# Patient Record
Sex: MALE | Race: WHITE | URBAN - METROPOLITAN AREA
[De-identification: names, ages, dates, MRNs, and addresses within clinical notes are randomized per-mention and may not be internally consistent; named-entity substitution may affect disease eponyms.]

---

## 2018-01-25 ENCOUNTER — TRANSFERRED RECORDS (OUTPATIENT)
Dept: HEALTH INFORMATION MANAGEMENT | Facility: CLINIC | Age: 63
End: 2018-01-25

## 2018-04-10 ENCOUNTER — TRANSFERRED RECORDS (OUTPATIENT)
Dept: HEALTH INFORMATION MANAGEMENT | Facility: CLINIC | Age: 63
End: 2018-04-10

## 2018-04-11 ENCOUNTER — REFERRAL (OUTPATIENT)
Dept: TRANSPLANT | Facility: CLINIC | Age: 63
End: 2018-04-11

## 2018-04-11 NOTE — LETTER
Request for Records from Referring Providers Office for Patients Referred to [facility] Solid Organ Transplant Program    April 30, 2018    Re: Michele Herrera   [address]  [place] [address]   1955    Requested Records       Due Responsible    24 hr impedence mammometry      6 Minute Walk      Angiogram      Chest CT      [facility] [place], [place]  2018      Chest XR      Colonoscopy      Stated none, will ask his PCP      Dental      Dexa scan      EKG      Echo w bubble study      Fecal occult blood      Gastric Emptying Study      Hip & Spine films XR      Left Heart Cath      Men: PSA      PFT      2018  [facility]      Quanitiative perfusion scan      Right Heart Cath      Sinus CT      Vaccinations up-to-date            In addition to the above records, please include all provider notes and diagnostic test results from the last 12 months.        Requested imaging may be electronically sent to the [employer] system via [id number] DICOM connection. When unable to send imaging electronically, an exported DICOM CD may be sent. Please indicate when imaging has been sent electronically on your return cover sheet.    Requested pathology slides should be accompanied by the appropriate report from your institution.    When the patient is hand carrying requested records or the requested records are not at your facility, please indicate this information on a return cover sheet.    Please fax requested paper records to [phone].     Please send all scans/slides to:   [facility]  Solid Organ Transplant Office  [address] [place] [id number]    Please call our office at [phone] if you have any questions or concerns.

## 2018-04-11 NOTE — TELEPHONE ENCOUNTER
Intake Progress Note      Nurse Call: 5/2/18 with Kingston yumiko 10- 12  Save the Date: pending npv and evaluation        Insurance information:  Medicare A/ B  Policy hutton: patient  Subscriber/policy/ID number:   Group Number:            Health Maintenance:  Colon: stated none, will ask PCP  PSA: stated none  Dental: visit in the past 1 year, no issues noted  Vaccines:   Special Needs (ie wheelchair, assistance, guardian, interpretor): n/a      Referral intake process completed.  Patient is aware that after financial approval is received, medical records will be requested.   Patient confirmed for a callback from transplant coordinator on 5/2.  Confirmed coordinator will discuss evaluation process in more detail at the time of their call.   Patient is aware of the need to arrange age appropriate cancer screening, vaccinations, and dental care.  Reminded patient to complete questionnaire, complete medical records release, and review packet prior to evaluation visit   Assessed patient for Special needs (ie wheelchair, assistance, guardian, and ): n/a   Patient instructed to call [phone] with questions.

## 2018-04-11 NOTE — LETTER
Michele Herrera  [address]  [place] [address]      Dear Michele,    Thank you for your interest in the Transplant Center at [facility], [facility]. We look forward to being a part of your care team and assisting you through the transplant process.    As we discussed, your transplant coordinator is Christina Clarke (Lung).  You may call your coordinator at any time with questions or concerns.  Your first scheduled call will be on  at 5/2/18.  If this needs to change, call [phone].    Please complete the following.    1. Fill out and return the enclosed forms    Authorization for Electronic Communication    Authorization to Discuss Protected Health Information    [employer] Release of Information    2. Sign up for:    [employer], access to your electronic medical record (see enclosed pamphlet)    [employer], a transplant education website (see enclosed booklet)    You can use these tools to learn more about your transplant, communicate with your care team, and track your medical details      Sincerely,      Solid Organ Transplant  [facility], [facility]    cc: Referring Physician PCP

## 2018-04-12 VITALS — WEIGHT: 121 LBS | BODY MASS INDEX: 16.39 KG/M2 | HEIGHT: 72 IN

## 2018-04-17 ENCOUNTER — DOCUMENTATION ONLY (OUTPATIENT)
Dept: TRANSPLANT | Facility: CLINIC | Age: 63
End: 2018-04-17

## 2018-04-17 NOTE — PROGRESS NOTES
Received message from PFELZA Miranda patient is not financially cleared for transplant until we receive current insurance information. Patient did not have current insurance information available at time of intake call.

## 2018-05-02 ENCOUNTER — TELEPHONE (OUTPATIENT)
Dept: TRANSPLANT | Facility: CLINIC | Age: 63
End: 2018-05-02

## 2018-05-02 DIAGNOSIS — J44.9 COPD (CHRONIC OBSTRUCTIVE PULMONARY DISEASE) (H): ICD-10-CM

## 2018-05-02 DIAGNOSIS — E46 MALNUTRITION (H): ICD-10-CM

## 2018-05-02 DIAGNOSIS — E88.01 ALPHA-1-ANTITRYPSIN DEFICIENCY (H): Primary | ICD-10-CM

## 2018-05-02 DIAGNOSIS — Z76.82 LUNG TRANSPLANT CANDIDATE: ICD-10-CM

## 2018-05-02 NOTE — TELEPHONE ENCOUNTER
"SOT LUNG INTAKE    May 2, 2018    Michele Herrrea  [id number]  Referring Provider: Edison Dailey   Source/Facility: [facility]    Diagnosis: A1AT/COPD  Level 22 mg/dl; Phenotype (ZZ)  Dx: 5 years ago, no infusion treatments initiated  62 year old    Height: 6  Weight: 121 lbs  BMI: 16.5 (134 lbs, BMI 18) Was on Daliresp, stopped by Dr Dailey to to continue nausea and subsequent weightloss    Patient noted his normal weight 160, reporting he has lost 40 lbs over last 1-2 years. Weight reported on PFTs completed 1/25/2018     Social History:    Social History     Social History     Marital status:      Spouse name: N/A     Number of children: N/A     Years of education: N/A     Occupational History     Not on file.     Social History Main Topics     Smoking status: Former Smoker     Packs/day: 0.50     Types: Cigarettes     Start date: 1/1/1973     Quit date: 1/1/1984     Smokeless tobacco: Never Used     Alcohol use Yes      Comment: cocktail occ      Drug use: No     Sexual activity: Not on file     Other Topics Concern     Not on file     Social History Narrative       Smoking History: 1 ppd   Quit Date: Quit 1984     Tobacco Use: None   Quit date: n/a    Street Drug Use: Marijuana   Quit Date: Cant remember last use reporting it has not been recent    ETOH Use: Beer, 6 pack/day, patient reports \"I really love my beer\", but did not believe he would have any issues with etoh abstinence.    Quit Date: n/a    Second-hand Smoke exposure: None    Family History:    Family History   Problem Relation Age of Onset     CANCER Mother      CANCER Sister        Past Medical History  Pulmonary Manifestations date: 2006 approximately  Diabetes: No  Coronary Artery Disease: No, Family Hx: Mother  Hypertension: No   Previous transfusion(s): No  History of Cancer: No, Family Hx: sister has brain CA   GERD: Yes, omeprazole daily for several years, patient reports his GERD symptoms are well controled.    Bleeding " Disorders: No    Other Past Medical History: Patient reports having pericarditis in his 20s a couple of times.     Past Medical History:   Diagnosis Date     Alpha-1-antitrypsin deficiency (H)      COPD (chronic obstructive pulmonary disease) (H)        Surgical History   Lung Biopsy: No  Pneumothorax: No  Chest Surgery: No    No past surgical history on file.    Mental Health History  Depression: denies  Anxiety: denies  Other: n/a    Medications  No current outpatient prescriptions on file.     No current facility-administered medications for this visit.      Blood thinner hx: No  Prednisone hx: No daily rx, reported two large tapers within last 12 months  Antibiotic hx: Azithromycin 250 mg/ daily  Narcotic hx: denies    Allergies  Review of patient's allergies indicates no known allergies.      Pulmonary Tests and Status  PFT's  Date: 01/28/2018 [facility]   FVC 4.25 85%  FEV1 1.27 34%  DLCO  13 mL 36% TLC 9.18L 125%  Date: 04/11/2016 [facility] ([facility]   FVC 4.12 83%  FEV1 1.28 34%  DLCO 10.05L 34% TLC 9.30 129%    6 Minute Walk: 04/10/2018 1000 Ft on 1L    Oxygen use - Patient reports he is not sure how much O2 he should be using    Per Dr Dailey's note dated 4/10/2018 Patient should be on following:   At rest: 0L   Sleep: 1L   With Activity: 1-2L ( During walk test patient desaturated to 87% at 400 ft on RA. 1L applied and patient's saturations increased to 91%).    Date of O2 initiation: Noted as patient uses but not indicated Liter flow    Oxygen Company: [employer], Initiated through Dr Dailey, [facility]     Sleep Study: No    BiPAP/CPAP: No - Though per Dr Dailey's note 4/10/2018 he discussed the possibnilty of using the Trilogy (NIV) to help with work of breathing at night and limit CO2 retension.     Pulmonary Rehab: Has not completed    Current activity:  Basic ADLs    Feeding: No issues noted  Toileting: No issues noted  Selecting proper attire: Not  assessed  Grooming: No issues noted  Maintaining continence: No issues noted  Putting on clothing: No issues noted  Bathing; No issues noted  Walking & Transferring: Patient reports walking about 1 block before feeling SOB    Instrumental ADLs    Managing Finances; No issues noted  Handling Transportation ; No issues noted  Shopping ; No issues noted  Preparing meals ; No issues noted  Using telephone & communication devices: No in home computer or wifi  Managing medications: No issues noted, patient reported he takes very few medications.   Housework & basic home maintenance: Limited but tries to do as much as he can to help out      Hospitalizations in prior 12 months - No hospitalizations in the last 12 months. Last reported hospitalization approximately 3 years ago due to pneumonia.     Diagnostic Tests/Imaging  Heart cath: No  Stress Test: No    ECHO: 07/18/2018  Impressions:  The echocardiographic study was abnormal. Bicuspid aortic valve with mild  stenosis. Moderate dilation of the aortic root and ascending aorta without  aortic insufficiency. Small apical pericardial effusion without hemodynamic  consequences. When compared to previous study of 5/24/17, the aortic valve is  slightly more stenotic and the pericardial effusion appears to be new. Other  findings are stable. There was no echocardiographic evidence for a cardiac  source of embolism. Consider ZACHARY if cardiac source of embolus appears highly  Likely.  ECHO 05/24/2017  Impressions:  The echocardiographic study was abnormal. LVEF 60% with probable bicuspid aortic  valve with moderate dilation of the aortic root (45mm). Mild aortic stenosis  Visually.    Primary Care  Health Maintenance   Topic Date Due     TETANUS IMMUNIZATION (SYSTEM ASSIGNED)  09/17/1973     HIV SCREEN (SYSTEM ASSIGNED)  09/17/1973     HEPATITIS C SCREENING  09/17/1973     LIPID SCREEN Q5 YR MALE (SYSTEM ASSIGNED)  09/17/1990     COLON CANCER SCREEN (SYSTEM ASSIGNED)  09/17/2005      ADVANCE DIRECTIVE PLANNING Q5 YRS  09/17/2010     INFLUENZA VACCINE (Season Ended) 09/01/2018       Colonoscopy: None  Dental: Last seen 2 years ago, not current issues noted    Labs  A1AT:4/11/2018 [facility] 28; Phenotype  ZZ  Rheumatology: No  Cystic Fibrosis: No  Cultures: No        Psycho-Social Assessment  Spouse/Significant Other/Partner: Cami   Location: [place]   Distance from [facility]: [address] Miles   Support System: Agustín   Location: [place]   Distance from [facility]: 40    Employment Status: Diabled  (Full-time, part-time, disabled, etc.)  Work history: Business Owner  Toxic Substance Exposure: Denies exposure to asbestos, pesticides, dust  Home Environment: Home with stairs  Pets/Birds: [place]    Home Health care utilized: No    Financial Concerns: None

## 2018-07-30 NOTE — PROGRESS NOTES
"Reason for Visit  Michele Herrera is a 62 year old year old male who is being seen for Transplant Evaluation (Pre lung tx eval for COPD)      Pulmonary HPI  The patient was seen and examined by Jorge Alberto Brooke MD.     Mr. Herrera is a 62-year-old gentleman with history of COPD due to alpha-1 antitrypsin deficiency who is being seen today in our clinic to be considered for lung transplantation candidacy. He is being seen at the request of Dr. Edison Dailey [Pulmonologist]. Reviewed outside records.       He first noticed becoming more short of breath with daily, normal manual labor about 9 years ago, which prompted his wife to make him go to the doctor who diagnosed him with COPD. About 6 years ago, he visited a specialist who determined the patient had alpha-1 antitrypsin deficiency. The last three dawson, the patient has had pneumonia twice and bronchitis twice. Two years ago, he was hospitalized for the first time and then hospitalized again for five days. His primary care doctor then had him visit Dr. Dailey [Pulmonlogist].    He is supposed to be on oxygen. However, he does not use all the time. He may use oxygen when confined in his house during the winter and may occasionally use it at night, prior to sleeping. He does not like using oxygen at night because he \"flops around like a crappy out of water\" while sleeping. He will use 1.5 LPM O2 NC. Otherwise, he will use BiPAP. He patient received a BiPAP machine about a month ago and will use an hour in the morning and an hour at night. He will also use the BiPAP if he feels he is not breathing well. He finds it beneficial.    Manual labor is what primarily causes him to become short of breath. 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 The patient has grown accustomed to his declining lung function and has learned to take his time more. He has no problem walking on flat ground or climbing the 15 steps in his home. However, prior to using the BiPAP, he " "did have trouble climbing the 15 steps in his home.    The last time the patient needed Prednisone was in January 2018. He also ill in November 2017 and December 2017. In addition, the patient was hospitalized in the winter of 2016/2017.        He also has \"six things wrong with his back\" and is in a lot of pain. Sitting and walking cause him pain, but he is much better than in the past. Stretching alleviates his low back pain. He states he \"broke his back\" while rollerblading with his son, but never went to the doctor.    He will only occasionally cough after nebulizing. He nebulizes in the morning and at night. He does have a baseline wheezing, but can be worse if he overexerts himself doing manual labor. He has heartburn, but it is managed with Omeprazole. Before his heartburn being managed, the patient would vomit every morning. This has since resolved since taking Omeprazole. No sinus issues, headaches, or chest pain. No constipation, diarrhea, or bloody or dark/tarry stools. No trouble passing urine or CHARLIE. No arthralgia or unusual numbness or tingling in arms or legs. No fevers, chills, or sweats. No ear infections.    The patient weighs 130 pounds and has lost 30 pounds in the last 12 years. He has no appetite. The patient mainly eats one big meal at night, but is trying to eat fruit, drink a Boost, and eat a granola bar throughout the day as well.    No surgeries or need for blood transfusions in the past.      The patient also notes an incidence of spontaneous, transient vision loss in right eye. He visited an ophthalmologist who noted he was having right eye strokes. After being instructed to take a daily aspirin, the patient has not had spontaneous, transient vision loss in his right eye. He was recommended to have an echocardiogram and EKG.      Family History: Maternal uncle smokes cigarettes and has liver disease and alpha-1 antitrypsin deficiency (COPD).    Oxygen Use: He is currently using 1-2 L as " needed. At rest does not seem to need oxygen. But while walking his O2 sats 90 seem to drop to 87% at 400 feet. He is able to maintain his sats greater than 90% on 1 L/min oxygen.    Exposure History: The patient quit smoking cigarettes in 1983, but started when he was 18 years old - for about 15 years. He would smoke 0.5 to 0.75 pack of cigarettes per day. The patient also used to smoke marijuana when he was younger. He has eaten edible marijuana more recently with last eating about two weeks ago. The patient has drank about 5 to 6 beers since 18 years old.    No known allergies to dust, pollen, or other environmental allergens.    The patient currently has a dog at home. 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    Occupational History: He worked as a  with no occupational related asbestos or dust exposures. The patient had his own farm, raises own meat and vegetables, but states he doesn't have an appetite so doesn't eat much.    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 Also, in the past, he did lawn-care for 15 years. No exposure to TB. 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      Current Outpatient Prescriptions   Medication     acetaminophen-codeine (TYLENOL #3) 300-30 MG per tablet     albuterol (PROAIR HFA/PROVENTIL HFA/VENTOLIN HFA) 108 (90 Base) MCG/ACT Inhaler     arformoterol (BROVANA) 15 MCG/2ML NEBU neb solution     azithromycin (ZITHROMAX) 250 MG tablet     budesonide (PULMICORT) 0.5 MG/2ML neb solution     ibuprofen (ADVIL/MOTRIN) 800 MG tablet     ipratropium (ATROVENT HFA) 17 MCG/ACT Inhaler     ipratropium - albuterol 0.5 mg/2.5 mg/3 mL (DUONEB) 0.5-2.5 (3) MG/3ML neb solution     omeprazole (PRILOSEC) 20 MG CR capsule     traZODone (DESYREL) 50 MG tablet     No current facility-administered medications for this visit.      No Known Allergies  Past Medical History:   Diagnosis Date     Alpha-1-antitrypsin  deficiency (H)      COPD (chronic obstructive pulmonary disease) (H)      GERD (gastroesophageal reflux disease)        No past surgical history on file.    Social History     Social History     Marital status:      Spouse name: N/A     Number of children: N/A     Years of education: N/A     Occupational History     Not on file.     Social History Main Topics     Smoking status: Former Smoker     Packs/day: 1.00     Types: Cigarettes     Start date: 1/1/1963     Quit date: 1/25/1984     Smokeless tobacco: Never Used     Alcohol use Yes      Comment: cocktail occ      Drug use: No     Sexual activity: Not on file     Other Topics Concern     Not on file     Social History Narrative       Family History   Problem Relation Age of Onset     Cancer Mother      Cancer Sister        ROS Pulmonary  Constitutional- Positive. He has had a 40 pound weight loss recently but now it is stable.  Eyes- Negative  Ear, nose and throat- Negative  Cardiac- Negative  Pulm- See HPI  GI- Positive. Heartburn with associated emesis that is managed with Omeprazole.  Genitourinary- Negative  Musculoskeletal- Positive. Chronic low back pain that is aggravated with sitting and walking and alleviated by stretching.  Neurology- Negative  Dermatology- Negative  Endocrine- Negative  Lymphatic- Negative  Psychiatry- Negative  A complete ROS was otherwise negative except as noted in the HPI.    /77 (BP Location: Right arm, Patient Position: Sitting, Cuff Size: Adult Regular)  Pulse 84  Temp 98.2  F (36.8  C) (Oral)  Resp 18  Ht 1.829 m (6')  Wt 59 kg (130 lb)  SpO2 95%  BMI 17.63 kg/m2  Exam:   GENERAL APPEARANCE: Well developed, thin gentleman, alert, and in no apparent distress.  EYES: PERRL, EOMI  HENT: Nasal mucosa with no edema and no hyperemia. No nasal polyps.  EARS: Canals clear, TMs normal  MOUTH: Oral mucosa is moist, without any lesions, no tonsillar enlargement, no oropharyngeal exudate.  NECK: supple, no masses, no  thyromegaly.  LYMPHATICS: No significant axillary, cervical, or supraclavicular nodes.  RESP: normal percussion, diminished air flow throughout.  No crackles. No rhonchi. No wheezes.  CV: Normal S1, S2, regular rhythm, normal rate. No murmur.  No rub. No gallop. No LE edema.   ABDOMEN:  Bowel sounds normal, soft, nontender, no HSM or masses.   MS: extremities normal. No clubbing. No cyanosis.  SKIN: no rash on limited exam. He is very Tanned.  NEURO: Mentation intact, speech normal, normal strength and tone, normal gait and stance  PSYCH: mentation appears normal. and affect normal/bright.    Results:  Recent Results (from the past 168 hour(s))   6 minute walk test    Collection Time: 07/31/18 12:00 AM   Result Value Ref Range    6 min walk (FT) 1250 ft    6 Min Walk (M) 381 m   CBC with platelets    Collection Time: 07/31/18 10:25 AM   Result Value Ref Range    WBC 7.9 4.0 - 11.0 10e9/L    RBC Count 4.62 4.4 - 5.9 10e12/L    Hemoglobin 15.7 13.3 - 17.7 g/dL    Hematocrit 46.8 40.0 - 53.0 %     (H) 78 - 100 fl    MCH 34.0 (H) 26.5 - 33.0 pg    MCHC 33.5 31.5 - 36.5 g/dL    RDW 13.2 10.0 - 15.0 %    Platelet Count 179 150 - 450 10e9/L   Comprehensive metabolic panel    Collection Time: 07/31/18 10:25 AM   Result Value Ref Range    Sodium 140 133 - 144 mmol/L    Potassium 4.5 3.4 - 5.3 mmol/L    Chloride 108 94 - 109 mmol/L    Carbon Dioxide 25 20 - 32 mmol/L    Anion Gap 7 3 - 14 mmol/L    Glucose 104 (H) 70 - 99 mg/dL    Urea Nitrogen 15 7 - 30 mg/dL    Creatinine 1.02 0.66 - 1.25 mg/dL    GFR Estimate 74 >60 mL/min/1.7m2    GFR Estimate If Black 89 >60 mL/min/1.7m2    Calcium 8.9 8.5 - 10.1 mg/dL    Bilirubin Total 0.9 0.2 - 1.3 mg/dL    Albumin 3.7 3.4 - 5.0 g/dL    Protein Total 6.8 6.8 - 8.8 g/dL    Alkaline Phosphatase 90 40 - 150 U/L    ALT 40 0 - 70 U/L    AST 42 0 - 45 U/L   Blood gas venous    Collection Time: 07/31/18 10:25 AM   Result Value Ref Range    Ph Venous 7.40 7.32 - 7.43 pH    PCO2 Venous  48 40 - 50 mm Hg    PO2 Venous 19 (L) 25 - 47 mm Hg    Bicarbonate Venous 29 (H) 21 - 28 mmol/L    Base Excess Venous 3.3 mmol/L    FIO2 21.0    General PFT Lab (Please always keep checked)    Collection Time: 07/31/18 10:35 AM   Result Value Ref Range    FVC-Pred 4.85 L    FVC-Pre 3.28 L    FVC-%Pred-Pre 67 %    FEV1-Pre 0.93 L    FEV1-%Pred-Pre 25 %    FEV1FVC-Pred 75 %    FEV1FVC-Pre 28 %    FEFMax-Pred 9.46 L/sec    FEFMax-Pre 3.69 L/sec    FEFMax-%Pred-Pre 38 %    FEF2575-Pred 2.96 L/sec    FEF2575-Pre 0.31 L/sec    URO6262-%Pred-Pre 10 %    ExpTime-Pre 13.69 sec    FIFMax-Pre 5.75 L/sec    VC-Pred 5.28 L    VC-Pre 3.63 L    VC-%Pred-Pre 68 %    IC-Pred 3.27 L    IC-Pre 2.40 L    IC-%Pred-Pre 73 %    ERV-Pred 2.01 L    ERV-Pre 1.22 L    ERV-%Pred-Pre 60 %    FEV1FEV6-Pred 79 %    FEV1FEV6-Pre 38 %    FRCPleth-Pred 3.76 L    FRCPleth-Pre 6.38 L    FRCPleth-%Pred-Pre 169 %    RVPleth-Pred 2.55 L    RVPleth-Pre 5.15 L    RVPleth-%Pred-Pre 202 %    TLCPleth-Pred 7.53 L    TLCPleth-Pre 8.78 L    TLCPleth-%Pred-Pre 116 %    DLCOunc-Pred 28.61 ml/min/mmHg    DLCOunc-Pre 15.00 ml/min/mmHg    DLCOunc-%Pred-Pre 52 %    DLCOcor-Pre 14.57 ml/min/mmHg    DLCOcor-%Pred-Pre 50 %    VA-Pre 5.79 L    VA-%Pred-Pre 80 %    FEV1SVC-Pred 70 %    FEV1SVC-Pre 26 %       PFTs were reviewed by me.  FEV 1 0.93L 25%  FVC 3.28L 67%  TLC 8.78L 116%  RV 5.15L 202%  DLCO 15.00 52%    Interpretation:  Very severe obstructive ventilatory defect with moderate decrease in diffusion capacity.  Valid Maneuver    6MWT: Walked 1250ft (381m), O2 sats dropped down to 88%.    Echo (7/18/18): lVEF 60%. Mild diastolic dysfunction. Bicuspid aortic valve with mild stenosis. Moderate dilation of the aortic root and ascending aorta without aortic insufficiency. Small apical pericardial effusion without hemodynamic consequences. When compared to previous study of 5/24/17, the aortic valve is slightly more stenotic and the pericardial effusion appears to be  "new. There was no echocardiographic evidence for a cardiac source of embolism. Consider ZACHARY if cardiac source of embolus appears highly likely.  The root exhibited moderate dilation. There was moderate dilatation of the ascending aorta. The aortic root diameter was 47 mm. The ascending aortic AP dimension was 48 mm.    Assessment and plan: Mr. Herrera is a 62-year-old gentleman with history of COPD due to alpha-1 antitrypsin deficiency who is being seen today in our clinic clinic to be considered for lung transplantation candidacy. He is being seen at the request of Dr. Edison Dailey [pulmonologist].    1) Severe COPD: Due to A1AT deficiency and past h/o smoking cigarettes. He is currently on maximal therapy and well managed by Dr. Dailey. ANNELISE index shows 57% 4yr survival.    He is using Brovana and Pulmicort nebs consistently.    Tried Daliresp but had to stop due to nausea. Now on chronic Azithromycin.    Hypoxic Respiratory Failure: Using O2 appropriately. On Non-invasive ventilation.    7/31/2018: FEV1 today (0.93) is worse from previous evaluation (1.27) at [facility]. Pulmonary symptoms are currently stable. He was recommended to use his oxygen more to prevent stress on heart. Will continue current medications at this time.    2) GERD: As of 7/31/2018, GERD with associated morning emesis has been managed very well with Omeprazole.    3) Low BMI: Current Body mass index is 17.63 kg/(m^2).   7/31/2018: He was encouraged to include more fat in his diet as well as to eat more to gain weight. He will monitor his weight at home with home scale. Goal BMI >18. Ideally 19 - 20.    4) Elevated MCV: No anemia.  - Would recommend follow up with PCP. Might related to alcohol intake.  - consider checking Vit b12/folate level.    5) H/o Pericarditis: \"All his life\".   - Not on any meds for that.    6) Chronic back pain: Currently managed with prn ibuprofen.    7) Spontaneous, Transient Right Eye Vision Loss: He has had " spontaneous, transient right eye vision loss. He visited the ophthalmologist who recommended a daily Aspirin. He has not had spontaneous, transient right eye vision loss since.  - Currently undergoing cardiac w/u.    8) Healthcare Maintenance:  - Recommended to get a colonoscopy in the near future.  - Recommended liver be monitored.  - Recommended lowering and discontinuing, alcohol consumption.    9) Lung Transplant Consideration:  A. I spent quite some time discussing both the lung transplantation evaluation listing process including complications that can be expected post lung transplantation.     B. One of the main points I did reinforce is that the survival post lung transplantation at this time is around 50 to 55% at 5 years. The main reason for this is infection and/or rejection. While most bacterial infections are treatable, the viral infections can cause significant morbidity and mortality. Acute rejection is usually treatable with high dose steroids but chronic rejection (AHSAN) as you already know does not have good therapy as of date. The other main point is that there is minimal to no survival advantage with lung transplantation.    C. The lung transplant evaluation will involve multiple tests and meeting with cardiothoracic surgeon, Transplant , Nutritionist etc., from our transplant team. Post testing, we will discuss the details at our transplant meeting and will be listed if he meets the criteria for lung transplantation.     D. Once on the list, Michele Herrera can expect to stay on the list anywhere from few months to few years, depending on the LAS score. Also the other factors that might play a role is number of organs required and whether he is positive for panel reactive antibodies. He has not recieved transfusions to date. He will need to stay within a 50 mile radius of our center for the first three months after the lung transplantation (after hospital discharge).    E. Post lung  transplantation, he will require multiple bronchoscopies to evaluate for infections and/or rejections. The major complications post transplantation experienced by most of our patients include:    1. Diabetes, about 30 to 40% of our patients remain on insulin for the rest of their life.  2. Chronic kidney disease, with majority losing about 50% of the kidney function and upto 5 ot 10% requiring hemodialysis and/or renal transplantation.  3. Hypertension, usually well controlled with medications.  4. Malignancy: Especially of skin cancer, and/or lymphoma - usually treatable.    The above is not an exhaustive list of all the complications. The others include also airway complications occurring in about 5% of the patients requiring bronchoscopy with bronchial dilation, sometimes stent placement. There is increased risk for DVT and PE particularly in the first six months after transplantation.     F. Discussed with Michele Herrera that lung transplantation is an option. The other option is to continue as is and continue cares with us or with his local pulmonologist. We will glad to have palliative care team involved in your care to help manage your symptoms.    G. Discussed about increased risk donors (For HIV/Hep C predominantly).      I discussed indications for lung transplantation for the COPD population. This includes a decline in the FEV1 to less than 25% of predicted, worsening respiratory status (more dyspnea), more frequent or prolonged exacerbations, and oxygen requirements of 2L of more. Also discussed the lung transplant benefit in COPD population, an article which was in the 2008 American Journal of Respiratory and Critical Care Medicine. That analysis of 10,000 wait-list patients showed that COPD patients with an FEV1 of less than 25% of predicted and who are oxygen dependent do indeed survive longer with transplant than without. While that data is not meant to be used clinically, it does give us some  evidence that indeed transplant for some COPD patients does result in improved survival.     We then discussed that although we believe that transplant for the COPD population results in improved quality of life and function, there is no empirical evidence to support that. In fact, there are no studies that have measured quality of life or functional status pre and posttransplant consistently in large populations. It is of course our goal to make that happen. Why that may not happen is the potential complications posttransplant complications.      RTC in 6 to 12 months with spirometry.  I spent >60mins in face to face meeting in counselling and co-ordination.    Scribe Disclosure:   I, Hussein Montoya, am serving as a scribe; to document services personally performed by Jorge Alberto Brooke MD based on data collection and the provider's statements to me.     Provider Disclosure:  I agree with above History, Review of Systems, Physical exam and Plan. I have reviewed the content of the documentation and have edited it as needed. I have personally performed the services documented here and the documentation accurately represents those services and the decisions I have made.      Electronically signed by:  Jorge Alberto Brooke MD.

## 2018-07-31 ENCOUNTER — APPOINTMENT (OUTPATIENT)
Dept: TRANSPLANT | Facility: CLINIC | Age: 63
End: 2018-07-31
Attending: INTERNAL MEDICINE
Payer: MEDICARE

## 2018-07-31 VITALS
SYSTOLIC BLOOD PRESSURE: 135 MMHG | TEMPERATURE: 98.2 F | HEART RATE: 84 BPM | BODY MASS INDEX: 17.61 KG/M2 | DIASTOLIC BLOOD PRESSURE: 77 MMHG | OXYGEN SATURATION: 95 % | HEIGHT: 72 IN | WEIGHT: 130 LBS | RESPIRATION RATE: 18 BRPM

## 2018-07-31 DIAGNOSIS — E88.01 ALPHA-1-ANTITRYPSIN DEFICIENCY (H): ICD-10-CM

## 2018-07-31 DIAGNOSIS — Z76.82 LUNG TRANSPLANT CANDIDATE: Primary | ICD-10-CM

## 2018-07-31 DIAGNOSIS — Z76.82 LUNG TRANSPLANT CANDIDATE: ICD-10-CM

## 2018-07-31 DIAGNOSIS — J44.9 COPD (CHRONIC OBSTRUCTIVE PULMONARY DISEASE) (H): ICD-10-CM

## 2018-07-31 DIAGNOSIS — J43.2 CENTRILOBULAR EMPHYSEMA (H): ICD-10-CM

## 2018-07-31 LAB
6 MIN WALK (FT): 1250 FT
6 MIN WALK (M): 381 M
ALBUMIN SERPL-MCNC: 3.7 G/DL (ref 3.4–5)
ALP SERPL-CCNC: 90 U/L (ref 40–150)
ALT SERPL W P-5'-P-CCNC: 40 U/L (ref 0–70)
ANION GAP SERPL CALCULATED.3IONS-SCNC: 7 MMOL/L (ref 3–14)
AST SERPL W P-5'-P-CCNC: 42 U/L (ref 0–45)
BASE EXCESS BLDV CALC-SCNC: 3.3 MMOL/L
BILIRUB SERPL-MCNC: 0.9 MG/DL (ref 0.2–1.3)
BUN SERPL-MCNC: 15 MG/DL (ref 7–30)
CALCIUM SERPL-MCNC: 8.9 MG/DL (ref 8.5–10.1)
CHLORIDE SERPL-SCNC: 108 MMOL/L (ref 94–109)
CO2 SERPL-SCNC: 25 MMOL/L (ref 20–32)
CREAT SERPL-MCNC: 1.02 MG/DL (ref 0.66–1.25)
DLCOCOR-%PRED-PRE: 50 %
DLCOCOR-PRE: 14.57 ML/MIN/MMHG
DLCOUNC-%PRED-PRE: 52 %
DLCOUNC-PRE: 15 ML/MIN/MMHG
DLCOUNC-PRED: 28.61 ML/MIN/MMHG
ERV-%PRED-PRE: 60 %
ERV-PRE: 1.22 L
ERV-PRED: 2.01 L
ERYTHROCYTE [DISTWIDTH] IN BLOOD BY AUTOMATED COUNT: 13.2 % (ref 10–15)
EXPTIME-PRE: 13.69 SEC
FEF2575-%PRED-PRE: 10 %
FEF2575-PRE: 0.31 L/SEC
FEF2575-PRED: 2.96 L/SEC
FEFMAX-%PRED-PRE: 38 %
FEFMAX-PRE: 3.69 L/SEC
FEFMAX-PRED: 9.46 L/SEC
FEV1-%PRED-PRE: 25 %
FEV1-PRE: 0.93 L
FEV1FEV6-PRE: 38 %
FEV1FEV6-PRED: 79 %
FEV1FVC-PRE: 28 %
FEV1FVC-PRED: 75 %
FEV1SVC-PRE: 26 %
FEV1SVC-PRED: 70 %
FIFMAX-PRE: 5.75 L/SEC
FRCPLETH-%PRED-PRE: 169 %
FRCPLETH-PRE: 6.38 L
FRCPLETH-PRED: 3.76 L
FVC-%PRED-PRE: 67 %
FVC-PRE: 3.28 L
FVC-PRED: 4.85 L
GFR SERPL CREATININE-BSD FRML MDRD: 74 ML/MIN/1.7M2
GLUCOSE SERPL-MCNC: 104 MG/DL (ref 70–99)
HCO3 BLDV-SCNC: 29 MMOL/L (ref 21–28)
HCT VFR BLD AUTO: 46.8 % (ref 40–53)
HGB BLD-MCNC: 15.7 G/DL (ref 13.3–17.7)
IC-%PRED-PRE: 73 %
IC-PRE: 2.4 L
IC-PRED: 3.27 L
MCH RBC QN AUTO: 34 PG (ref 26.5–33)
MCHC RBC AUTO-ENTMCNC: 33.5 G/DL (ref 31.5–36.5)
MCV RBC AUTO: 101 FL (ref 78–100)
O2/TOTAL GAS SETTING VFR VENT: 21 %
PCO2 BLDV: 48 MM HG (ref 40–50)
PH BLDV: 7.4 PH (ref 7.32–7.43)
PLATELET # BLD AUTO: 179 10E9/L (ref 150–450)
PO2 BLDV: 19 MM HG (ref 25–47)
POTASSIUM SERPL-SCNC: 4.5 MMOL/L (ref 3.4–5.3)
PROT SERPL-MCNC: 6.8 G/DL (ref 6.8–8.8)
RBC # BLD AUTO: 4.62 10E12/L (ref 4.4–5.9)
RVPLETH-%PRED-PRE: 202 %
RVPLETH-PRE: 5.15 L
RVPLETH-PRED: 2.55 L
SODIUM SERPL-SCNC: 140 MMOL/L (ref 133–144)
TLCPLETH-%PRED-PRE: 116 %
TLCPLETH-PRE: 8.78 L
TLCPLETH-PRED: 7.53 L
VA-%PRED-PRE: 80 %
VA-PRE: 5.79 L
VC-%PRED-PRE: 68 %
VC-PRE: 3.63 L
VC-PRED: 5.28 L
WBC # BLD AUTO: 7.9 10E9/L (ref 4–11)

## 2018-07-31 PROCEDURE — 82803 BLOOD GASES ANY COMBINATION: CPT | Performed by: INTERNAL MEDICINE

## 2018-07-31 PROCEDURE — 85027 COMPLETE CBC AUTOMATED: CPT | Performed by: INTERNAL MEDICINE

## 2018-07-31 PROCEDURE — 80053 COMPREHEN METABOLIC PANEL: CPT | Performed by: INTERNAL MEDICINE

## 2018-07-31 PROCEDURE — 36415 COLL VENOUS BLD VENIPUNCTURE: CPT | Performed by: INTERNAL MEDICINE

## 2018-07-31 PROCEDURE — G0463 HOSPITAL OUTPT CLINIC VISIT: HCPCS | Mod: ZF

## 2018-07-31 RX ORDER — ARFORMOTEROL TARTRATE 15 UG/2ML
15 SOLUTION RESPIRATORY (INHALATION) 2 TIMES DAILY
COMMUNITY
Start: 2018-01-25 | End: 2019-01-25

## 2018-07-31 RX ORDER — IPRATROPIUM BROMIDE AND ALBUTEROL SULFATE 2.5; .5 MG/3ML; MG/3ML
SOLUTION RESPIRATORY (INHALATION) DAILY
COMMUNITY
Start: 2017-12-22

## 2018-07-31 RX ORDER — IBUPROFEN 800 MG/1
800 TABLET, FILM COATED ORAL EVERY 8 HOURS PRN
COMMUNITY

## 2018-07-31 RX ORDER — ALBUTEROL SULFATE 90 UG/1
1-2 AEROSOL, METERED RESPIRATORY (INHALATION) EVERY 4 HOURS PRN
COMMUNITY
Start: 2018-01-30

## 2018-07-31 RX ORDER — BUDESONIDE 0.5 MG/2ML
INHALANT ORAL 2 TIMES DAILY
COMMUNITY
Start: 2018-01-16

## 2018-07-31 RX ORDER — TRAZODONE HYDROCHLORIDE 50 MG/1
50 TABLET, FILM COATED ORAL AT BEDTIME
COMMUNITY
Start: 2018-04-09

## 2018-07-31 RX ORDER — AZITHROMYCIN 250 MG/1
250 TABLET, FILM COATED ORAL DAILY
COMMUNITY
Start: 2018-04-12

## 2018-07-31 ASSESSMENT — PAIN SCALES - GENERAL: PAINLEVEL: NO PAIN (0)

## 2018-07-31 NOTE — PROGRESS NOTES
Outpatient MNT: Lung Transplant Evaluation    Current BMI: 17.6 (HT 72 in,  lbs/59 kg)  BMI is below criteria of >18 for lung transplant  Goal weight for lung transplant >131 lbs      Time Spent: 15 minutes  Visit Type: Initial  Referring Physician: Dr Brooke   Pt accompanied by: his wife, Cami     History of previous txp: none     Nutrition Assessment  Pt and wife both cook at home. Pt reports overall improved appetite recently. C/o nausea and diarrhea x 2 months when on a certain medication, which now he is off of. Appetite not quite back to baseline, as he reports 'I've never been normal'. Long habit of only grazing during the day, eating 1 solid meal in the evening. He does report he is intentionally trying to gain weight.     Vitamins, Supplements, Pertinent Meds: none   Herbal Medicines/Supplements: none     Diet Recall  Breakfast None    Lunch Grazes throughout the day on fruit, granola bars   Dinner Meat/protein + veggies + small portion of starch    Snacks None    Beverages 1 Boost/day for the past few months, coffee    Alcohol 6 pack of beer/day, so average of 42 beers/week (summer); pt reports he is more active in the summer and thus drinks more in the summer; pt drinks less in the winter, but still drinks 4 drinks/day at minimum; he also reports having quit drinking for a few years a while back and has no concern about quitting this time around    Dining out 2x/month      Physical Activity  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 He did lose down to his lowest of 122 lbs (from starting point in 130s roughly) when on reported medication, but has regained some and is stable currently.     Adj/dosing BW: 130 lbs/59 kg       Frailty Screening   Weakness Meets criteria for frailty if   strength (average of 3 trials, dominant hand) is:    Men  ?29 kg for BMI ?24  ?30 kg for BMI 24.1-26  ?30 kg for BMI 26.1-28  ?32 kg for BMI >28 Women  ?17 kg for BMI ?23  ?17.3 kg for BMI 23.1-26  ?18 kg for BMI 26.1-29  ?21 kg for BMI >29    Equipment: Shaq hand dynamometer  Participant attempts to squeeze the dynamometer maximally 3 times with the dominant hand.   Average of 3 trials: 41 kg with BMI of 17.6  Meets criteria for frailty based on handgrip strength: no     Labs  No results for input(s): CHOL, HDL, LDL, TRIG, CHOLHDLRATIO in the last 86133 hours.  No results found for: A1C    Malnutrition  % Intake: No decreased intake noted  % Weight Loss: None noted  Subcutaneous Fat Loss: No loss noted, but low at baseline  Muscle Loss: No loss noted, but low at baseline  Fluid Accumulation/Edema: None noted  Malnutrition Diagnosis: Patient does not meet two of the above criteria necessary for diagnosing malnutrition, however, would suspect pt is at risk for malnutrition given low BMI, difficulty gaining weight back to 140-150s (prior UBW), heavy EtOH use and likely nutritional deficiencies that are correlated with EtOH intake.     Estimated Nutrition Needs  Energy  [phone]     (30-35-40 kcal/kg for increased needs)     Protein  59-71    (1-1.2 g/kg for repletion)         Fluid  1 ml/kcal or per MD     Nutrition Diagnosis  Inadequate protein intake (?energy intake as well) r/t long habit of eating 1 meal/day with some grazing throughout the day, other medical issues in the past causing pt reduced appetite and intake AEB weight loss down to 122 lbs, now back at 130 lbs but BMI still underweight <18 and below lung transplant BMI criteria, low muscle and fat mass at baseline, pt report of trying to gain weight yet weight stable lately, diet recall showing 1 meal/day with significant amount of calories coming from alcohol.     Food and nutrition related knowledge deficit r/t pre lung transplant eval AEB pt  verbalized not hearing pre/post transplant diet guidelines.    Nutrition Intervention  Nutrition education provided:  Did briefly mention and discuss with pt need for abstinence from alcohol post transplant. Pt reports he has quit drinking before and forsees no difficulty or issue stopping drinking.     Reviewed BMI criteria for lung transplant. Encouraged pt to gain at least 5 lbs, preferably more, to serve as a buffer and discussed that it is common to lose weight in the acute post txp phase. Encouraged him to continue with 1 Boost/day (or more) and to eat something more substantial during the day, such as another protein shake or protein bars. Pt reports he is willing to do this. It will be interesting to see how pt's weight may or may not change when he cuts out alcohol.     Reviewed post txp diet guidelines in brief (will review in further detail post txp):  (1) Review of proper food safety measures d/t immunosuppressant therapy post-op and increased risk for food-borne illness    (2) Avoid the following post txp d/t risk for rejection, unknown effects on the organs, and/or potential interactions with immunosuppressants:  - Herbal, Chinese, holistic, chiropractic, natural, alternative medicines and supplements  - Detoxes and cleanses  - Weight loss pills  - Protein powders or other products with extracts or herbs (ie green tea extract)    (3) Med regimen and possible side effects    Patient Understanding: Pt verbalized understanding of education provided.  Expected Compliance: Fair/Good  Follow-Up Plans: PRN     Nutrition Goals  1. Pt to verbalize understanding of 3 aspects of post txp education provided  2. BMI >18 or >131 lbs for transplant eligibility     Provided pt with contact info.   Constance Chavez RD, LD  [facility] [phone]

## 2018-07-31 NOTE — LETTER
"7/31/2018       RE: Michele Herrera  [address] [place] [address]     Dear Colleague,    Thank you for referring your patient, Michele Herrera, to the [facility] at [facility]. Please see a copy of my visit note below.    Reason for Visit  Michele Herrera is a 62 year old year old male who is being seen for Transplant Evaluation (Pre lung tx eval for COPD)      Pulmonary HPI  The patient was seen and examined by Jorge Alberto Brooke MD.     Mr. Herrera is a 62-year-old gentleman with history of COPD due to alpha-1 antitrypsin deficiency who is being seen today in our clinic to be considered for lung transplantation candidacy. He is being seen at the request of Dr. Edison Dailey [Pulmonologist]. Reviewed outside records.       He first noticed becoming more short of breath with daily, normal manual labor about 9 years ago, which prompted his wife to make him go to the doctor who diagnosed him with COPD. About 6 years ago, he visited a specialist who determined the patient had alpha-1 antitrypsin deficiency. The last three dawson, the patient has had pneumonia twice and bronchitis twice. Two years ago, he was hospitalized for the first time and then hospitalized again for five days. His primary care doctor then had him visit Dr. Dailey [Pulmonlogist].    He is supposed to be on oxygen. However, he does not use all the time. He may use oxygen when confined in his house during the winter and may occasionally use it at night, prior to sleeping. He does not like using oxygen at night because he \"flops around like a crappy out of water\" while sleeping. He will use 1.5 LPM O2 NC. Otherwise, he will use BiPAP. He patient received a BiPAP machine about a month ago and will use an hour in the morning and an hour at night. He will also use the BiPAP if he feels he is not breathing well. He finds it beneficial.    Manual labor is what primarily causes him to become " "short of breath. 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 The patient has grown accustomed to his declining lung function and has learned to take his time more. He has no problem walking on flat ground or climbing the 15 steps in his home. However, prior to using the BiPAP, he did have trouble climbing the 15 steps in his home.    The last time the patient needed Prednisone was in January 2018. He also ill in November 2017 and December 2017. In addition, the patient was hospitalized in the winter of 2016/2017.        He also has \"six things wrong with his back\" and is in a lot of pain. Sitting and walking cause him pain, but he is much better than in the past. Stretching alleviates his low back pain. He states he \"broke his back\" while rollerblading with his son, but never went to the doctor.    He will only occasionally cough after nebulizing. He nebulizes in the morning and at night. He does have a baseline wheezing, but can be worse if he overexerts himself doing manual labor. He has heartburn, but it is managed with Omeprazole. Before his heartburn being managed, the patient would vomit every morning. This has since resolved since taking Omeprazole. No sinus issues, headaches, or chest pain. No constipation, diarrhea, or bloody or dark/tarry stools. No trouble passing urine or CHARLIE. No arthralgia or unusual numbness or tingling in arms or legs. No fevers, chills, or sweats. No ear infections.    The patient weighs 130 pounds and has lost 30 pounds in the last 12 years. He has no appetite. The patient mainly eats one big meal at night, but is trying to eat fruit, drink a Boost, and eat a granola bar throughout the day as well.    No surgeries or need for blood transfusions in the past.      The patient also notes an incidence of spontaneous, transient vision loss in right eye. He visited an ophthalmologist who noted he was having right eye strokes. After being instructed to take a daily aspirin, the " patient has not had spontaneous, transient vision loss in his right eye. He was recommended to have an echocardiogram and EKG.      Family History: Maternal uncle smokes cigarettes and has liver disease and alpha-1 antitrypsin deficiency (COPD).    Oxygen Use: He is currently using 1-2 L as needed. At rest does not seem to need oxygen. But while walking his O2 sats 90 seem to drop to 87% at 400 feet. He is able to maintain his sats greater than 90% on 1 L/min oxygen.    Exposure History: The patient quit smoking cigarettes in 1983, but started when he was 18 years old - for about 15 years. He would smoke 0.5 to 0.75 pack of cigarettes per day. The patient also used to smoke marijuana when he was younger. He has eaten edible marijuana more recently with last eating about two weeks ago. The patient has drank about 5 to 6 beers since 18 years old.    No known allergies to dust, pollen, or other environmental allergens.    The patient currently has a dog at home. 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    Occupational History: He worked as a  with no occupational related asbestos or dust exposures. The patient had his own farm, raises own meat and vegetables, but states he doesn't have an appetite so doesn't eat much.    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 Also, in the past, he did lawn-care for 15 years. No exposure to TB. 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      Current Outpatient Prescriptions   Medication     acetaminophen-codeine (TYLENOL #3) 300-30 MG per tablet     albuterol (PROAIR HFA/PROVENTIL HFA/VENTOLIN HFA) 108 (90 Base) MCG/ACT Inhaler     arformoterol (BROVANA) 15 MCG/2ML NEBU neb solution     azithromycin (ZITHROMAX) 250 MG tablet     budesonide (PULMICORT) 0.5 MG/2ML neb solution     ibuprofen (ADVIL/MOTRIN) 800 MG tablet     ipratropium (ATROVENT HFA) 17 MCG/ACT Inhaler     ipratropium - albuterol 0.5  mg/2.5 mg/3 mL (DUONEB) 0.5-2.5 (3) MG/3ML neb solution     omeprazole (PRILOSEC) 20 MG CR capsule     traZODone (DESYREL) 50 MG tablet     No current facility-administered medications for this visit.      No Known Allergies  Past Medical History:   Diagnosis Date     Alpha-1-antitrypsin deficiency (H)      COPD (chronic obstructive pulmonary disease) (H)      GERD (gastroesophageal reflux disease)        No past surgical history on file.    Social History     Social History     Marital status:      Spouse name: N/A     Number of children: N/A     Years of education: N/A     Occupational History     Not on file.     Social History Main Topics     Smoking status: Former Smoker     Packs/day: 1.00     Types: Cigarettes     Start date: 1/1/1963     Quit date: 1/25/1984     Smokeless tobacco: Never Used     Alcohol use Yes      Comment: cocktail occ      Drug use: No     Sexual activity: Not on file     Other Topics Concern     Not on file     Social History Narrative       Family History   Problem Relation Age of Onset     Cancer Mother      Cancer Sister        ROS Pulmonary  Constitutional- Positive. He has had a 40 pound weight loss recently but now it is stable.  Eyes- Negative  Ear, nose and throat- Negative  Cardiac- Negative  Pulm- See HPI  GI- Positive. Heartburn with associated emesis that is managed with Omeprazole.  Genitourinary- Negative  Musculoskeletal- Positive. Chronic low back pain that is aggravated with sitting and walking and alleviated by stretching.  Neurology- Negative  Dermatology- Negative  Endocrine- Negative  Lymphatic- Negative  Psychiatry- Negative  A complete ROS was otherwise negative except as noted in the HPI.    /77 (BP Location: Right arm, Patient Position: Sitting, Cuff Size: Adult Regular)  Pulse 84  Temp 98.2  F (36.8  C) (Oral)  Resp 18  Ht 1.829 m (6')  Wt 59 kg (130 lb)  SpO2 95%  BMI 17.63 kg/m2  Exam:   GENERAL APPEARANCE: Well developed, thin gentleman,  alert, and in no apparent distress.  EYES: PERRL, EOMI  HENT: Nasal mucosa with no edema and no hyperemia. No nasal polyps.  EARS: Canals clear, TMs normal  MOUTH: Oral mucosa is moist, without any lesions, no tonsillar enlargement, no oropharyngeal exudate.  NECK: supple, no masses, no thyromegaly.  LYMPHATICS: No significant axillary, cervical, or supraclavicular nodes.  RESP: normal percussion, diminished air flow throughout.  No crackles. No rhonchi. No wheezes.  CV: Normal S1, S2, regular rhythm, normal rate. No murmur.  No rub. No gallop. No LE edema.   ABDOMEN:  Bowel sounds normal, soft, nontender, no HSM or masses.   MS: extremities normal. No clubbing. No cyanosis.  SKIN: no rash on limited exam. He is very Tanned.  NEURO: Mentation intact, speech normal, normal strength and tone, normal gait and stance  PSYCH: mentation appears normal. and affect normal/bright.    Results:  Recent Results (from the past 168 hour(s))   6 minute walk test    Collection Time: 07/31/18 12:00 AM   Result Value Ref Range    6 min walk (FT) 1250 ft    6 Min Walk (M) 381 m   CBC with platelets    Collection Time: 07/31/18 10:25 AM   Result Value Ref Range    WBC 7.9 4.0 - 11.0 10e9/L    RBC Count 4.62 4.4 - 5.9 10e12/L    Hemoglobin 15.7 13.3 - 17.7 g/dL    Hematocrit 46.8 40.0 - 53.0 %     (H) 78 - 100 fl    MCH 34.0 (H) 26.5 - 33.0 pg    MCHC 33.5 31.5 - 36.5 g/dL    RDW 13.2 10.0 - 15.0 %    Platelet Count 179 150 - 450 10e9/L   Comprehensive metabolic panel    Collection Time: 07/31/18 10:25 AM   Result Value Ref Range    Sodium 140 133 - 144 mmol/L    Potassium 4.5 3.4 - 5.3 mmol/L    Chloride 108 94 - 109 mmol/L    Carbon Dioxide 25 20 - 32 mmol/L    Anion Gap 7 3 - 14 mmol/L    Glucose 104 (H) 70 - 99 mg/dL    Urea Nitrogen 15 7 - 30 mg/dL    Creatinine 1.02 0.66 - 1.25 mg/dL    GFR Estimate 74 >60 mL/min/1.7m2    GFR Estimate If Black 89 >60 mL/min/1.7m2    Calcium 8.9 8.5 - 10.1 mg/dL    Bilirubin Total 0.9 0.2 -  1.3 mg/dL    Albumin 3.7 3.4 - 5.0 g/dL    Protein Total 6.8 6.8 - 8.8 g/dL    Alkaline Phosphatase 90 40 - 150 U/L    ALT 40 0 - 70 U/L    AST 42 0 - 45 U/L   Blood gas venous    Collection Time: 07/31/18 10:25 AM   Result Value Ref Range    Ph Venous 7.40 7.32 - 7.43 pH    PCO2 Venous 48 40 - 50 mm Hg    PO2 Venous 19 (L) 25 - 47 mm Hg    Bicarbonate Venous 29 (H) 21 - 28 mmol/L    Base Excess Venous 3.3 mmol/L    FIO2 21.0    General PFT Lab (Please always keep checked)    Collection Time: 07/31/18 10:35 AM   Result Value Ref Range    FVC-Pred 4.85 L    FVC-Pre 3.28 L    FVC-%Pred-Pre 67 %    FEV1-Pre 0.93 L    FEV1-%Pred-Pre 25 %    FEV1FVC-Pred 75 %    FEV1FVC-Pre 28 %    FEFMax-Pred 9.46 L/sec    FEFMax-Pre 3.69 L/sec    FEFMax-%Pred-Pre 38 %    FEF2575-Pred 2.96 L/sec    FEF2575-Pre 0.31 L/sec    FZH5440-%Pred-Pre 10 %    ExpTime-Pre 13.69 sec    FIFMax-Pre 5.75 L/sec    VC-Pred 5.28 L    VC-Pre 3.63 L    VC-%Pred-Pre 68 %    IC-Pred 3.27 L    IC-Pre 2.40 L    IC-%Pred-Pre 73 %    ERV-Pred 2.01 L    ERV-Pre 1.22 L    ERV-%Pred-Pre 60 %    FEV1FEV6-Pred 79 %    FEV1FEV6-Pre 38 %    FRCPleth-Pred 3.76 L    FRCPleth-Pre 6.38 L    FRCPleth-%Pred-Pre 169 %    RVPleth-Pred 2.55 L    RVPleth-Pre 5.15 L    RVPleth-%Pred-Pre 202 %    TLCPleth-Pred 7.53 L    TLCPleth-Pre 8.78 L    TLCPleth-%Pred-Pre 116 %    DLCOunc-Pred 28.61 ml/min/mmHg    DLCOunc-Pre 15.00 ml/min/mmHg    DLCOunc-%Pred-Pre 52 %    DLCOcor-Pre 14.57 ml/min/mmHg    DLCOcor-%Pred-Pre 50 %    VA-Pre 5.79 L    VA-%Pred-Pre 80 %    FEV1SVC-Pred 70 %    FEV1SVC-Pre 26 %       PFTs were reviewed by me.  FEV 1 0.93L 25%  FVC 3.28L 67%  TLC 8.78L 116%  RV 5.15L 202%  DLCO 15.00 52%    Interpretation:  Very severe obstructive ventilatory defect with moderate decrease in diffusion capacity.  Valid Maneuver    6MWT: Walked 1250ft (381m), O2 sats dropped down to 88%.    Echo (7/18/18): lVEF 60%. Mild diastolic dysfunction. Bicuspid aortic valve with mild stenosis.  Moderate dilation of the aortic root and ascending aorta without aortic insufficiency. Small apical pericardial effusion without hemodynamic consequences. When compared to previous study of 5/24/17, the aortic valve is slightly more stenotic and the pericardial effusion appears to be new. There was no echocardiographic evidence for a cardiac source of embolism. Consider ZACHARY if cardiac source of embolus appears highly likely.  The root exhibited moderate dilation. There was moderate dilatation of the ascending aorta. The aortic root diameter was 47 mm. The ascending aortic AP dimension was 48 mm.    Assessment and plan: Mr. Herrera is a 62-year-old gentleman with history of COPD due to alpha-1 antitrypsin deficiency who is being seen today in our clinic clinic to be considered for lung transplantation candidacy. He is being seen at the request of Dr. Edison Dailey [pulmonologist].    1) Severe COPD: Due to A1AT deficiency and past h/o smoking cigarettes. He is currently on maximal therapy and well managed by Dr. Dailey. ANNELISE index shows 57% 4yr survival.    He is using Brovana and Pulmicort nebs consistently.    Tried Daliresp but had to stop due to nausea. Now on chronic Azithromycin.    Hypoxic Respiratory Failure: Using O2 appropriately. On Non-invasive ventilation.    7/31/2018: FEV1 today (0.93) is worse from previous evaluation (1.27) at [facility]. Pulmonary symptoms are currently stable. He was recommended to use his oxygen more to prevent stress on heart. Will continue current medications at this time.    2) GERD: As of 7/31/2018, GERD with associated morning emesis has been managed very well with Omeprazole.    3) Low BMI: Current Body mass index is 17.63 kg/(m^2).   7/31/2018: He was encouraged to include more fat in his diet as well as to eat more to gain weight. He will monitor his weight at home with home scale. Goal BMI >18. Ideally 19 - 20.    4) Elevated MCV: No anemia.  - Would recommend follow  "up with PCP. Might related to alcohol intake.  - consider checking Vit b12/folate level.    5) H/o Pericarditis: \"All his life\".   - Not on any meds for that.    6) Chronic back pain: Currently managed with prn ibuprofen.    7) Spontaneous, Transient Right Eye Vision Loss: He has had spontaneous, transient right eye vision loss. He visited the ophthalmologist who recommended a daily Aspirin. He has not had spontaneous, transient right eye vision loss since.  - Currently undergoing cardiac w/u.    8) Healthcare Maintenance:  - Recommended to get a colonoscopy in the near future.  - Recommended liver be monitored.  - Recommended lowering and discontinuing, alcohol consumption.    9) Lung Transplant Consideration:  A. I spent quite some time discussing both the lung transplantation evaluation listing process including complications that can be expected post lung transplantation.     B. One of the main points I did reinforce is that the survival post lung transplantation at this time is around 50 to 55% at 5 years. The main reason for this is infection and/or rejection. While most bacterial infections are treatable, the viral infections can cause significant morbidity and mortality. Acute rejection is usually treatable with high dose steroids but chronic rejection (AHSAN) as you already know does not have good therapy as of date. The other main point is that there is minimal to no survival advantage with lung transplantation.    C. The lung transplant evaluation will involve multiple tests and meeting with cardiothoracic surgeon, Transplant , Nutritionist etc., from our transplant team. Post testing, we will discuss the details at our transplant meeting and will be listed if he meets the criteria for lung transplantation.     D. Once on the list, Michele Herrera can expect to stay on the list anywhere from few months to few years, depending on the LAS score. Also the other factors that might play a role is " number of organs required and whether he is positive for panel reactive antibodies. He has not recieved transfusions to date. He will need to stay within a 50 mile radius of our center for the first three months after the lung transplantation (after hospital discharge).    E. Post lung transplantation, he will require multiple bronchoscopies to evaluate for infections and/or rejections. The major complications post transplantation experienced by most of our patients include:    1. Diabetes, about 30 to 40% of our patients remain on insulin for the rest of their life.  2. Chronic kidney disease, with majority losing about 50% of the kidney function and upto 5 ot 10% requiring hemodialysis and/or renal transplantation.  3. Hypertension, usually well controlled with medications.  4. Malignancy: Especially of skin cancer, and/or lymphoma - usually treatable.    The above is not an exhaustive list of all the complications. The others include also airway complications occurring in about 5% of the patients requiring bronchoscopy with bronchial dilation, sometimes stent placement. There is increased risk for DVT and PE particularly in the first six months after transplantation.     F. Discussed with Michele Herrera that lung transplantation is an option. The other option is to continue as is and continue cares with us or with his local pulmonologist. We will glad to have palliative care team involved in your care to help manage your symptoms.    G. Discussed about increased risk donors (For HIV/Hep C predominantly).      I discussed indications for lung transplantation for the COPD population. This includes a decline in the FEV1 to less than 25% of predicted, worsening respiratory status (more dyspnea), more frequent or prolonged exacerbations, and oxygen requirements of 2L of more. Also discussed the lung transplant benefit in COPD population, an article which was in the 2008 American Journal of Respiratory and Critical  Care Medicine. That analysis of 10,000 wait-list patients showed that COPD patients with an FEV1 of less than 25% of predicted and who are oxygen dependent do indeed survive longer with transplant than without. While that data is not meant to be used clinically, it does give us some evidence that indeed transplant for some COPD patients does result in improved survival.     We then discussed that although we believe that transplant for the COPD population results in improved quality of life and function, there is no empirical evidence to support that. In fact, there are no studies that have measured quality of life or functional status pre and posttransplant consistently in large populations. It is of course our goal to make that happen. Why that may not happen is the potential complications posttransplant complications.      RTC in 6 to 12 months with spirometry.  I spent >60mins in face to face meeting in counselling and co-ordination.    Scribe Disclosure:   I, Hussein Montoya, am serving as a scribe; to document services personally performed by Jorge Alberto Brooke MD based on data collection and the provider's statements to me.     Provider Disclosure:  I agree with above History, Review of Systems, Physical exam and Plan. I have reviewed the content of the documentation and have edited it as needed. I have personally performed the services documented here and the documentation accurately represents those services and the decisions I have made.      Electronically signed by:  Jorge Alberto Brooke MD.    Again, thank you for allowing me to participate in the care of your patient.      Sincerely,    Jorge Alberto Brooke MD

## 2018-07-31 NOTE — MR AVS SNAPSHOT
"              After Visit Summary   7/31/2018    Michele Herrera    MRN: [id number]           Patient Information     Date Of Birth          1955        Visit Information        Provider Department      7/31/2018 1:10 PM Jorge Alberto Brooke MD [facility] Solid Organ Transplant        Care Instructions    Current oxygen use: Rest 0L Activity 1.5L Sleep 1.5L     6' 0\" 130 lbs 0 oz Body mass index is 17.63 kg/(m^2).  Goal BMI greater than 18, less than 30 for lung transplant.   Continue to eat a healthy diet which includes proteins and healthy fats.      Medication changes:  No changes made this visit  Future orders: No additional orders placed this visit  Antibody blood test (PRA) due every 3 months:  This is a specific transplant lab to be completed during and after lung transplant evalution.  Next appointment: No return visit scheduled today   Test or procedures to be complete prior to next appointment:   PFTs: Completed today, will redo annually or as MD recommends    6MW: Completed today, will redo annually or as MD recommends   CT Scan: Not completed today    Preventive Care:    Colorectal Cancer Screening: During our visit today, we discussed that it is recommended you receive colorectal cancer screening. Please call or make an appointment with your primary care provider to discuss this. You may also call the [facility] scheduling line ([phone]) to set up a colonoscopy appointment.    Pulmonary Rehab: Please remember to stay active.  Continue exercises learned in pulmonary rehab or continue participating in pulmonary rehab, if able.      Please remember to stay up to date with your primary care requirements including: Annual check-ups with primary care physician     PSA (for men over 50)   Dental visits   Annual flu shots/ immunizations as needed   Colonoscopy (patients over 50).     Thank-you for allowing us to participate in your care.    If your condition should change, please contact your transplant " coordinator. This includes: worsening symptoms, need for antibiotics, hospitalizations, transfusions.    Thoracic Transplant Office phone [phone], option 2, fax [phone]    Office Hours 8:30 - 5:00 pm              Follow-ups after your visit        Who to contact     If you have questions or need follow up information about today's clinic visit or your schedule please contact [facility] SOLID ORGAN TRANSPLANT directly at [phone].  Normal or non-critical lab and imaging results will be communicated to you by MyChart, letter or phone within 4 business days after the clinic has received the results. If you do not hear from us within 7 days, please contact the clinic through Devunityhart or phone. If you have a critical or abnormal lab result, we will notify you by phone as soon as possible.  Submit refill requests through Ghost or call your pharmacy and they will forward the refill request to us. Please allow 3 business days for your refill to be completed.          Additional Information About Your Visit        Care EveryWhere ID     This is your Care EveryWhere ID. This could be used by other organizations to access your [place] medical records  [id number]        Your Vitals Were     Pulse Temperature Respirations Height Pulse Oximetry BMI (Body Mass Index)    84 98.2  F (36.8  C) (Oral) 18 1.829 m (6') 95% 17.63 kg/m2       Blood Pressure from Last 3 Encounters:   07/31/18 135/77    Weight from Last 3 Encounters:   07/31/18 59 kg (130 lb)   04/12/18 54.9 kg (121 lb)              Today, you had the following     No orders found for display      Information about OPIOIDS     PRESCRIPTION OPIOIDS: WHAT YOU NEED TO KNOW   We gave you an opioid (narcotic) pain medicine. It is important to manage your pain, but opioids are not always the best choice. You should first try all the other options your care team gave you. Take this medicine for as short a time (and as few doses) as possible.     These medicines  have risks:    DO NOT drive when on new or higher doses of pain medicine. These medicines can affect your alertness and reaction times, and you could be arrested for driving under the influence (DUI). If you need to use opioids long-term, talk to your care team about driving.    DO NOT operate heave machinery    DO NOT do any other dangerous activities while taking these medicines.     DO NOT drink any alcohol while taking these medicines.      If the opioid prescribed includes acetaminophen, DO NOT take with any other medicines that contain acetaminophen. Read all labels carefully. Look for the word  acetaminophen  or  Tylenol.  Ask your pharmacist if you have questions or are unsure.    You can get addicted to pain medicines, especially if you have a history of addiction (chemical, alcohol or substance dependence). Talk to your care team about ways to reduce this risk.    Store your pills in a secure place, locked if possible. We will not replace any lost or stolen medicine. If you don t finish your medicine, please throw away (dispose) as directed by your pharmacist. The [place] [employer]ution [employer] has more information about safe disposal: https://www.pca.[facility].[place]/living-green/managing-unwanted-medications.     All opioids tend to cause constipation. Drink plenty of water and eat foods that have a lot of fiber, such as fruits, vegetables, prune juice, apple juice and high-fiber cereal. Take a laxative (Miralax, milk of magnesia, Colace, Senna) if you don t move your bowels at least every other day.          Primary Care Provider Office Phone # Fax #    Dakota Romero [phone]       [place] [place] [place] [address]        Equal Access to Services     [facility]: Hadii sheron meehan hadashchinyere Sobrice, waaxda luqadaha, qaybta kaalmada orlando, claudia canales . So [facility] [phone].    ATENCIÓN: Si habla español, tiene a vizcarra disposición servicios gratuitos  de asistencia lingüística. Sophie montes [phone].    We comply with applicable federal civil rights laws and [place] laws. We do not discriminate on the basis of race, color, national origin, age, disability, sex, sexual orientation, or gender identity.            Thank you!     Thank you for choosing [facility] SOLID ORGAN TRANSPLANT  for your care. Our goal is always to provide you with excellent care. Hearing back from our patients is one way we can continue to improve our services. Please take a few minutes to complete the written survey that you may receive in the mail after your visit with us. Thank you!             Your Updated Medication List - Protect others around you: Learn how to safely use, store and throw away your medicines at www.disposemymeds.org.          This list is accurate as of 7/31/18  2:37 PM.  Always use your most recent med list.                   Brand Name Dispense Instructions for use Diagnosis    acetaminophen-codeine 300-30 MG per tablet    TYLENOL #3     Take 1 tablet by mouth as needed        albuterol 108 (90 Base) MCG/ACT Inhaler    PROAIR HFA/PROVENTIL HFA/VENTOLIN HFA     Inhale 1-2 puffs into the lungs every 4 hours as needed for shortness of breath / dyspnea        arformoterol 15 MCG/2ML Nebu neb solution    BROVANA     Take 15 mcg by nebulization 2 times daily        azithromycin 250 MG tablet    ZITHROMAX     Take 250 mg by mouth daily        budesonide 0.5 MG/2ML neb solution    PULMICORT     Take by nebulization 2 times daily        ibuprofen 800 MG tablet    ADVIL/MOTRIN     Take 800 mg by mouth every 8 hours as needed for moderate pain        ipratropium - albuterol 0.5 mg/2.5 mg/3 mL 0.5-2.5 (3) MG/3ML neb solution    DUONEB     Take by nebulization daily        ipratropium 17 MCG/ACT Inhaler    ATROVENT HFA     Inhale 2 puffs into the lungs every 4 hours as needed for shortness of breath / dyspnea        omeprazole 20 MG CR capsule    priLOSEC     Take 20 mg by mouth  daily        traZODone 50 MG tablet    DESYREL     Take 50 mg by mouth At Bedtime

## 2018-07-31 NOTE — MR AVS SNAPSHOT
After Visit Summary   7/31/2018    Michele Herrera    MRN: [id number]           Patient Information     Date Of Birth          1955        Visit Information        Provider Department      7/31/2018 12:00 PM Phylicia Chavez RD [facility] Solid Organ Transplant        Today's Diagnoses     Lung transplant candidate    -  1       Follow-ups after your visit        Your next 10 appointments already scheduled     Jul 31, 2018  2:30 PM CDT   (Arrive by 2:15 PM)   SOT SOCIAL WORK EVAL with LA Ferguson   [facility] Solid Organ Transplant ([facility])    [address] [place] [address]  [address]  [facility] [address] [phone]              Who to contact     If you have questions or need follow up information about today's clinic visit or your schedule please contact [facility] SOLID ORGAN TRANSPLANT directly at [phone].  Normal or non-critical lab and imaging results will be communicated to you by MyChart, letter or phone within 4 business days after the clinic has received the results. If you do not hear from us within 7 days, please contact the clinic through MyChart or phone. If you have a critical or abnormal lab result, we will notify you by phone as soon as possible.  Submit refill requests through [email or web address] or call your pharmacy and they will forward the refill request to us. Please allow 3 business days for your refill to be completed.          Additional Information About Your Visit        Care EveryWhere ID     This is your Care EveryWhere ID. This could be used by other organizations to access your [place] medical records  [id number]         Blood Pressure from Last 3 Encounters:   07/31/18 135/77    Weight from Last 3 Encounters:   07/31/18 59 kg (130 lb)   04/12/18 54.9 kg (121 lb)              Today, you had the following     No orders found for display       Primary Care Provider Office Phone # Fax #    Dakota Romero [phone]        [facility] [address] [place] [address]        Equal Access to Services     DARIANA ADRIAN : Hadii aad ku hadligiao Sovirajali, waaxda luqadaha, qaybta kabladimirda franciaryan, waxjoslyn sabain hayaalew feldmanmila lind la'elilew . So [facility] [phone].    ATENCIÓN: Si habla español, tiene a vizcarra disposición servicios gratuitos de asistencia lingüística. Llame al [phone].    We comply with applicable federal civil rights laws and [place] laws. We do not discriminate on the basis of race, color, national origin, age, disability, sex, sexual orientation, or gender identity.            Thank you!     Thank you for choosing [facility] SOLID ORGAN TRANSPLANT  for your care. Our goal is always to provide you with excellent care. Hearing back from our patients is one way we can continue to improve our services. Please take a few minutes to complete the written survey that you may receive in the mail after your visit with us. Thank you!             Your Updated Medication List - Protect others around you: Learn how to safely use, store and throw away your medicines at www.disposemymeds.org.          This list is accurate as of 7/31/18  1:59 PM.  Always use your most recent med list.                   Brand Name Dispense Instructions for use Diagnosis    acetaminophen-codeine 300-30 MG per tablet    TYLENOL #3     Take 1 tablet by mouth as needed        albuterol 108 (90 Base) MCG/ACT Inhaler    PROAIR HFA/PROVENTIL HFA/VENTOLIN HFA     Inhale 1-2 puffs into the lungs every 4 hours as needed for shortness of breath / dyspnea        arformoterol 15 MCG/2ML Nebu neb solution    BROVANA     Take 15 mcg by nebulization 2 times daily        azithromycin 250 MG tablet    ZITHROMAX     Take 250 mg by mouth daily        budesonide 0.5 MG/2ML neb solution    PULMICORT     Take by nebulization 2 times daily        ibuprofen 800 MG tablet    ADVIL/MOTRIN     Take 800 mg by mouth every 8 hours as needed for moderate pain        ipratropium -  albuterol 0.5 mg/2.5 mg/3 mL 0.5-2.5 (3) MG/3ML neb solution    DUONEB     Take by nebulization daily        ipratropium 17 MCG/ACT Inhaler    ATROVENT HFA     Inhale 2 puffs into the lungs every 4 hours as needed for shortness of breath / dyspnea        omeprazole 20 MG CR capsule    priLOSEC     Take 20 mg by mouth daily        traZODone 50 MG tablet    DESYREL     Take 50 mg by mouth At Bedtime

## 2018-07-31 NOTE — LETTER
"7/31/2018      RE: Michele Herrera  [address] [facility] [address]       Reason for Visit  Michele Herrera is a 62 year old year old male who is being seen for Transplant Evaluation (Pre lung tx eval for COPD)      Pulmonary HPI  The patient was seen and examined by Jorge Alberto Brooke MD.     Mr. Herrera is a 62-year-old gentleman with history of COPD due to alpha-1 antitrypsin deficiency who is being seen today in our clinic to be considered for lung transplantation candidacy. He is being seen at the request of Dr. Edison Dailey [Pulmonologist]. Reviewed outside records.       He first noticed becoming more short of breath with daily, normal manual labor about 9 years ago, which prompted his wife to make him go to the doctor who diagnosed him with COPD. About 6 years ago, he visited a specialist who determined the patient had alpha-1 antitrypsin deficiency. The last three dawson, the patient has had pneumonia twice and bronchitis twice. Two years ago, he was hospitalized for the first time and then hospitalized again for five days. His primary care doctor then had him visit Dr. Dailey [Pulmonlogist].    He is supposed to be on oxygen. However, he does not use all the time. He may use oxygen when confined in his house during the winter and may occasionally use it at night, prior to sleeping. He does not like using oxygen at night because he \"flops around like a crappy out of water\" while sleeping. He will use 1.5 LPM O2 NC. Otherwise, he will use BiPAP. He patient received a BiPAP machine about a month ago and will use an hour in the morning and an hour at night. He will also use the BiPAP if he feels he is not breathing well. He finds it beneficial.    Manual labor is what primarily causes him to become short of breath. 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 The patient has grown accustomed to his declining lung function and has learned to take his time more. He has no problem walking on flat " "ground or climbing the 15 steps in his home. However, prior to using the BiPAP, he did have trouble climbing the 15 steps in his home.    The last time the patient needed Prednisone was in January 2018. He also ill in November 2017 and December 2017. In addition, the patient was hospitalized in the winter of 2016/2017.        He also has \"six things wrong with his back\" and is in a lot of pain. Sitting and walking cause him pain, but he is much better than in the past. Stretching alleviates his low back pain. He states he \"broke his back\" while rollerblading with his son, but never went to the doctor.    He will only occasionally cough after nebulizing. He nebulizes in the morning and at night. He does have a baseline wheezing, but can be worse if he overexerts himself doing manual labor. He has heartburn, but it is managed with Omeprazole. Before his heartburn being managed, the patient would vomit every morning. This has since resolved since taking Omeprazole. No sinus issues, headaches, or chest pain. No constipation, diarrhea, or bloody or dark/tarry stools. No trouble passing urine or CHARLIE. No arthralgia or unusual numbness or tingling in arms or legs. No fevers, chills, or sweats. No ear infections.    The patient weighs 130 pounds and has lost 30 pounds in the last 12 years. He has no appetite. The patient mainly eats one big meal at night, but is trying to eat fruit, drink a Boost, and eat a granola bar throughout the day as well.    No surgeries or need for blood transfusions in the past.      The patient also notes an incidence of spontaneous, transient vision loss in right eye. He visited an ophthalmologist who noted he was having right eye strokes. After being instructed to take a daily aspirin, the patient has not had spontaneous, transient vision loss in his right eye. He was recommended to have an echocardiogram and EKG.      Family History: Maternal uncle smokes cigarettes and has liver disease and " alpha-1 antitrypsin deficiency (COPD).    Oxygen Use: He is currently using 1-2 L as needed. At rest does not seem to need oxygen. But while walking his O2 sats 90 seem to drop to 87% at 400 feet. He is able to maintain his sats greater than 90% on 1 L/min oxygen.    Exposure History: The patient quit smoking cigarettes in 1983, but started when he was 18 years old - for about 15 years. He would smoke 0.5 to 0.75 pack of cigarettes per day. The patient also used to smoke marijuana when he was younger. He has eaten edible marijuana more recently with last eating about two weeks ago. The patient has drank about 5 to 6 beers since 18 years old.    No known allergies to dust, pollen, or other environmental allergens.    The patient currently has a dog at home. 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    Occupational History: He worked as a  with no occupational related asbestos or dust exposures. The patient had his own farm, raises own meat and vegetables, but states he doesn't have an appetite so doesn't eat much.    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 Also, in the past, he did lawn-care for 15 years. No exposure to TB. 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      Current Outpatient Prescriptions   Medication     acetaminophen-codeine (TYLENOL #3) 300-30 MG per tablet     albuterol (PROAIR HFA/PROVENTIL HFA/VENTOLIN HFA) 108 (90 Base) MCG/ACT Inhaler     arformoterol (BROVANA) 15 MCG/2ML NEBU neb solution     azithromycin (ZITHROMAX) 250 MG tablet     budesonide (PULMICORT) 0.5 MG/2ML neb solution     ibuprofen (ADVIL/MOTRIN) 800 MG tablet     ipratropium (ATROVENT HFA) 17 MCG/ACT Inhaler     ipratropium - albuterol 0.5 mg/2.5 mg/3 mL (DUONEB) 0.5-2.5 (3) MG/3ML neb solution     omeprazole (PRILOSEC) 20 MG CR capsule     traZODone (DESYREL) 50 MG tablet     No current facility-administered medications for this visit.       No Known Allergies  Past Medical History:   Diagnosis Date     Alpha-1-antitrypsin deficiency (H)      COPD (chronic obstructive pulmonary disease) (H)      GERD (gastroesophageal reflux disease)        No past surgical history on file.    Social History     Social History     Marital status:      Spouse name: N/A     Number of children: N/A     Years of education: N/A     Occupational History     Not on file.     Social History Main Topics     Smoking status: Former Smoker     Packs/day: 1.00     Types: Cigarettes     Start date: 1/1/1963     Quit date: 1/25/1984     Smokeless tobacco: Never Used     Alcohol use Yes      Comment: cocktail occ      Drug use: No     Sexual activity: Not on file     Other Topics Concern     Not on file     Social History Narrative       Family History   Problem Relation Age of Onset     Cancer Mother      Cancer Sister        ROS Pulmonary  Constitutional- Positive. He has had a 40 pound weight loss recently but now it is stable.  Eyes- Negative  Ear, nose and throat- Negative  Cardiac- Negative  Pulm- See HPI  GI- Positive. Heartburn with associated emesis that is managed with Omeprazole.  Genitourinary- Negative  Musculoskeletal- Positive. Chronic low back pain that is aggravated with sitting and walking and alleviated by stretching.  Neurology- Negative  Dermatology- Negative  Endocrine- Negative  Lymphatic- Negative  Psychiatry- Negative  A complete ROS was otherwise negative except as noted in the HPI.    /77 (BP Location: Right arm, Patient Position: Sitting, Cuff Size: Adult Regular)  Pulse 84  Temp 98.2  F (36.8  C) (Oral)  Resp 18  Ht 1.829 m (6')  Wt 59 kg (130 lb)  SpO2 95%  BMI 17.63 kg/m2  Exam:   GENERAL APPEARANCE: Well developed, thin gentleman, alert, and in no apparent distress.  EYES: PERRL, EOMI  HENT: Nasal mucosa with no edema and no hyperemia. No nasal polyps.  EARS: Canals clear, TMs normal  MOUTH: Oral mucosa is moist, without any  lesions, no tonsillar enlargement, no oropharyngeal exudate.  NECK: supple, no masses, no thyromegaly.  LYMPHATICS: No significant axillary, cervical, or supraclavicular nodes.  RESP: normal percussion, diminished air flow throughout.  No crackles. No rhonchi. No wheezes.  CV: Normal S1, S2, regular rhythm, normal rate. No murmur.  No rub. No gallop. No LE edema.   ABDOMEN:  Bowel sounds normal, soft, nontender, no HSM or masses.   MS: extremities normal. No clubbing. No cyanosis.  SKIN: no rash on limited exam. He is very Tanned.  NEURO: Mentation intact, speech normal, normal strength and tone, normal gait and stance  PSYCH: mentation appears normal. and affect normal/bright.    Results:  Recent Results (from the past 168 hour(s))   6 minute walk test    Collection Time: 07/31/18 12:00 AM   Result Value Ref Range    6 min walk (FT) 1250 ft    6 Min Walk (M) 381 m   CBC with platelets    Collection Time: 07/31/18 10:25 AM   Result Value Ref Range    WBC 7.9 4.0 - 11.0 10e9/L    RBC Count 4.62 4.4 - 5.9 10e12/L    Hemoglobin 15.7 13.3 - 17.7 g/dL    Hematocrit 46.8 40.0 - 53.0 %     (H) 78 - 100 fl    MCH 34.0 (H) 26.5 - 33.0 pg    MCHC 33.5 31.5 - 36.5 g/dL    RDW 13.2 10.0 - 15.0 %    Platelet Count 179 150 - 450 10e9/L   Comprehensive metabolic panel    Collection Time: 07/31/18 10:25 AM   Result Value Ref Range    Sodium 140 133 - 144 mmol/L    Potassium 4.5 3.4 - 5.3 mmol/L    Chloride 108 94 - 109 mmol/L    Carbon Dioxide 25 20 - 32 mmol/L    Anion Gap 7 3 - 14 mmol/L    Glucose 104 (H) 70 - 99 mg/dL    Urea Nitrogen 15 7 - 30 mg/dL    Creatinine 1.02 0.66 - 1.25 mg/dL    GFR Estimate 74 >60 mL/min/1.7m2    GFR Estimate If Black 89 >60 mL/min/1.7m2    Calcium 8.9 8.5 - 10.1 mg/dL    Bilirubin Total 0.9 0.2 - 1.3 mg/dL    Albumin 3.7 3.4 - 5.0 g/dL    Protein Total 6.8 6.8 - 8.8 g/dL    Alkaline Phosphatase 90 40 - 150 U/L    ALT 40 0 - 70 U/L    AST 42 0 - 45 U/L   Blood gas venous    Collection Time:  07/31/18 10:25 AM   Result Value Ref Range    Ph Venous 7.40 7.32 - 7.43 pH    PCO2 Venous 48 40 - 50 mm Hg    PO2 Venous 19 (L) 25 - 47 mm Hg    Bicarbonate Venous 29 (H) 21 - 28 mmol/L    Base Excess Venous 3.3 mmol/L    FIO2 21.0    General PFT Lab (Please always keep checked)    Collection Time: 07/31/18 10:35 AM   Result Value Ref Range    FVC-Pred 4.85 L    FVC-Pre 3.28 L    FVC-%Pred-Pre 67 %    FEV1-Pre 0.93 L    FEV1-%Pred-Pre 25 %    FEV1FVC-Pred 75 %    FEV1FVC-Pre 28 %    FEFMax-Pred 9.46 L/sec    FEFMax-Pre 3.69 L/sec    FEFMax-%Pred-Pre 38 %    FEF2575-Pred 2.96 L/sec    FEF2575-Pre 0.31 L/sec    IPE8306-%Pred-Pre 10 %    ExpTime-Pre 13.69 sec    FIFMax-Pre 5.75 L/sec    VC-Pred 5.28 L    VC-Pre 3.63 L    VC-%Pred-Pre 68 %    IC-Pred 3.27 L    IC-Pre 2.40 L    IC-%Pred-Pre 73 %    ERV-Pred 2.01 L    ERV-Pre 1.22 L    ERV-%Pred-Pre 60 %    FEV1FEV6-Pred 79 %    FEV1FEV6-Pre 38 %    FRCPleth-Pred 3.76 L    FRCPleth-Pre 6.38 L    FRCPleth-%Pred-Pre 169 %    RVPleth-Pred 2.55 L    RVPleth-Pre 5.15 L    RVPleth-%Pred-Pre 202 %    TLCPleth-Pred 7.53 L    TLCPleth-Pre 8.78 L    TLCPleth-%Pred-Pre 116 %    DLCOunc-Pred 28.61 ml/min/mmHg    DLCOunc-Pre 15.00 ml/min/mmHg    DLCOunc-%Pred-Pre 52 %    DLCOcor-Pre 14.57 ml/min/mmHg    DLCOcor-%Pred-Pre 50 %    VA-Pre 5.79 L    VA-%Pred-Pre 80 %    FEV1SVC-Pred 70 %    FEV1SVC-Pre 26 %       PFTs were reviewed by me.  FEV 1 0.93L 25%  FVC 3.28L 67%  TLC 8.78L 116%  RV 5.15L 202%  DLCO 15.00 52%    Interpretation:  Very severe obstructive ventilatory defect with moderate decrease in diffusion capacity.  Valid Maneuver    6MWT: Walked 1250ft (381m), O2 sats dropped down to 88%.    Echo (7/18/18): lVEF 60%. Mild diastolic dysfunction. Bicuspid aortic valve with mild stenosis. Moderate dilation of the aortic root and ascending aorta without aortic insufficiency. Small apical pericardial effusion without hemodynamic consequences. When compared to previous study of  "5/24/17, the aortic valve is slightly more stenotic and the pericardial effusion appears to be new. There was no echocardiographic evidence for a cardiac source of embolism. Consider ZACHARY if cardiac source of embolus appears highly likely.  The root exhibited moderate dilation. There was moderate dilatation of the ascending aorta. The aortic root diameter was 47 mm. The ascending aortic AP dimension was 48 mm.    Assessment and plan: Mr. Herrera is a 62-year-old gentleman with history of COPD due to alpha-1 antitrypsin deficiency who is being seen today in our clinic clinic to be considered for lung transplantation candidacy. He is being seen at the request of Dr. Edison Dailey [pulmonologist].    1) Severe COPD: Due to A1AT deficiency and past h/o smoking cigarettes. He is currently on maximal therapy and well managed by Dr. Dailey. ANNELISE index shows 57% 4yr survival.    He is using Brovana and Pulmicort nebs consistently.    Tried Daliresp but had to stop due to nausea. Now on chronic Azithromycin.    Hypoxic Respiratory Failure: Using O2 appropriately. On Non-invasive ventilation.    7/31/2018: FEV1 today (0.93) is worse from previous evaluation (1.27) at [facility]. Pulmonary symptoms are currently stable. He was recommended to use his oxygen more to prevent stress on heart. Will continue current medications at this time.    2) GERD: As of 7/31/2018, GERD with associated morning emesis has been managed very well with Omeprazole.    3) Low BMI: Current Body mass index is 17.63 kg/(m^2).   7/31/2018: He was encouraged to include more fat in his diet as well as to eat more to gain weight. He will monitor his weight at home with home scale. Goal BMI >18. Ideally 19 - 20.    4) Elevated MCV: No anemia.  - Would recommend follow up with PCP. Might related to alcohol intake.  - consider checking Vit b12/folate level.    5) H/o Pericarditis: \"All his life\".   - Not on any meds for that.    6) Chronic back pain: " Currently managed with prn ibuprofen.    7) Spontaneous, Transient Right Eye Vision Loss: He has had spontaneous, transient right eye vision loss. He visited the ophthalmologist who recommended a daily Aspirin. He has not had spontaneous, transient right eye vision loss since.  - Currently undergoing cardiac w/u.    8) Healthcare Maintenance:  - Recommended to get a colonoscopy in the near future.  - Recommended liver be monitored.  - Recommended lowering and discontinuing, alcohol consumption.    9) Lung Transplant Consideration:  A. I spent quite some time discussing both the lung transplantation evaluation listing process including complications that can be expected post lung transplantation.     B. One of the main points I did reinforce is that the survival post lung transplantation at this time is around 50 to 55% at 5 years. The main reason for this is infection and/or rejection. While most bacterial infections are treatable, the viral infections can cause significant morbidity and mortality. Acute rejection is usually treatable with high dose steroids but chronic rejection (AHSAN) as you already know does not have good therapy as of date. The other main point is that there is minimal to no survival advantage with lung transplantation.    C. The lung transplant evaluation will involve multiple tests and meeting with cardiothoracic surgeon, Transplant , Nutritionist etc., from our transplant team. Post testing, we will discuss the details at our transplant meeting and will be listed if he meets the criteria for lung transplantation.     D. Once on the list, Michele Herrera can expect to stay on the list anywhere from few months to few years, depending on the LAS score. Also the other factors that might play a role is number of organs required and whether he is positive for panel reactive antibodies. He has not recieved transfusions to date. He will need to stay within a 50 mile radius of our center  for the first three months after the lung transplantation (after hospital discharge).    E. Post lung transplantation, he will require multiple bronchoscopies to evaluate for infections and/or rejections. The major complications post transplantation experienced by most of our patients include:    1. Diabetes, about 30 to 40% of our patients remain on insulin for the rest of their life.  2. Chronic kidney disease, with majority losing about 50% of the kidney function and upto 5 ot 10% requiring hemodialysis and/or renal transplantation.  3. Hypertension, usually well controlled with medications.  4. Malignancy: Especially of skin cancer, and/or lymphoma - usually treatable.    The above is not an exhaustive list of all the complications. The others include also airway complications occurring in about 5% of the patients requiring bronchoscopy with bronchial dilation, sometimes stent placement. There is increased risk for DVT and PE particularly in the first six months after transplantation.     F. Discussed with Michele Herrera that lung transplantation is an option. The other option is to continue as is and continue cares with us or with his local pulmonologist. We will glad to have palliative care team involved in your care to help manage your symptoms.    G. Discussed about increased risk donors (For HIV/Hep C predominantly).      I discussed indications for lung transplantation for the COPD population. This includes a decline in the FEV1 to less than 25% of predicted, worsening respiratory status (more dyspnea), more frequent or prolonged exacerbations, and oxygen requirements of 2L of more. Also discussed the lung transplant benefit in COPD population, an article which was in the 2008 American Journal of Respiratory and Critical Care Medicine. That analysis of 10,000 wait-list patients showed that COPD patients with an FEV1 of less than 25% of predicted and who are oxygen dependent do indeed survive longer with  transplant than without. While that data is not meant to be used clinically, it does give us some evidence that indeed transplant for some COPD patients does result in improved survival.     We then discussed that although we believe that transplant for the COPD population results in improved quality of life and function, there is no empirical evidence to support that. In fact, there are no studies that have measured quality of life or functional status pre and posttransplant consistently in large populations. It is of course our goal to make that happen. Why that may not happen is the potential complications posttransplant complications.      RTC in 6 to 12 months with spirometry.  I spent >60mins in face to face meeting in counselling and co-ordination.    Scribe Disclosure:   I, Hussein Montoya, am serving as a scribe; to document services personally performed by Jorge Alberto Brooke MD based on data collection and the provider's statements to me.     Provider Disclosure:  I agree with above History, Review of Systems, Physical exam and Plan. I have reviewed the content of the documentation and have edited it as needed. I have personally performed the services documented here and the documentation accurately represents those services and the decisions I have made.      Electronically signed by:  Jorge Alberto Brooke MD.    Jorge Alberto Brooke MD

## 2018-07-31 NOTE — PATIENT INSTRUCTIONS
"Current oxygen use: Rest 0L Activity 1.5L Sleep 1.5L     6' 0\" 130 lbs 0 oz Body mass index is 17.63 kg/(m^2).  Goal BMI greater than 18, less than 30 for lung transplant.   Continue to eat a healthy diet which includes proteins and healthy fats.      Medication changes:  No changes made this visit  Future orders: No additional orders placed this visit  Antibody blood test (PRA) due every 3 months:  This is a specific transplant lab to be completed during and after lung transplant evalution.  Next appointment: No return visit scheduled today   Test or procedures to be complete prior to next appointment:   PFTs: Completed today, will redo annually or as MD recommends    6MW: Completed today, will redo annually or as MD recommends   CT Scan: Not completed today    Preventive Care:    Colorectal Cancer Screening: During our visit today, we discussed that it is recommended you receive colorectal cancer screening. Please call or make an appointment with your primary care provider to discuss this. You may also call the [facility] scheduling line ([phone]) to set up a colonoscopy appointment.    Pulmonary Rehab: Please remember to stay active.  Continue exercises learned in pulmonary rehab or continue participating in pulmonary rehab, if able.      Please remember to stay up to date with your primary care requirements including: Annual check-ups with primary care physician     PSA (for men over 50)   Dental visits   Annual flu shots/ immunizations as needed   Colonoscopy (patients over 50).     Thank-you for allowing us to participate in your care.    If your condition should change, please contact your transplant coordinator. This includes: worsening symptoms, need for antibiotics, hospitalizations, transfusions.    Thoracic Transplant Office phone [phone], option 2, fax [phone]    Office Hours 8:30 - 5:00 pm      "

## 2018-07-31 NOTE — NURSING NOTE
Chief Complaint   Patient presents with     Transplant Evaluation     Pre lung tx eval for COPD       /77 (BP Location: Right arm, Patient Position: Sitting, Cuff Size: Adult Regular)  Pulse 84  Temp 98.2  F (36.8  C) (Oral)  Resp 18  Ht 1.829 m (6')  Wt 59 kg (130 lb)  SpO2 95%  BMI 17.63 kg/m2    Sis Anrdews CMA  7/31/2018 1:06 PM

## 2018-08-12 PROBLEM — J43.2 CENTRILOBULAR EMPHYSEMA (H): Status: ACTIVE | Noted: 2018-08-12

## 2019-02-21 ENCOUNTER — DOCUMENTATION ONLY (OUTPATIENT)
Dept: CARE COORDINATION | Facility: CLINIC | Age: 64
End: 2019-02-21

## 2019-03-07 ENCOUNTER — PRE VISIT (OUTPATIENT)
Dept: CARDIOLOGY | Facility: CLINIC | Age: 64
End: 2019-03-07

## 2019-03-07 DIAGNOSIS — I71.21 ASCENDING AORTIC ANEURYSM (H): Primary | ICD-10-CM

## 2019-03-07 NOTE — TELEPHONE ENCOUNTER
Spoke to Dr Martinez regarding seeing this patient and she would like to see him in clinic but is asking for a CT of the chest with contrast for accurate measurement of an ascending aortic aneurysm in patient with bicuspid aortic valve.  Patient's records and echo film will be requested from [facility] in [facility].

## 2019-03-21 ENCOUNTER — TELEPHONE (OUTPATIENT)
Dept: CARDIOLOGY | Facility: CLINIC | Age: 64
End: 2019-03-21

## 2019-03-21 NOTE — TELEPHONE ENCOUNTER
Per nurse, pt had a CT done in Jan. No location given. LM for pt to call with location so we can request images.

## 2019-11-13 ENCOUNTER — DOCUMENTATION ONLY (OUTPATIENT)
Dept: TRANSPLANT | Facility: CLINIC | Age: 64
End: 2019-11-13

## 2019-11-13 NOTE — PROGRESS NOTES
Chart reviewed for closure of referral as last seen by pulmonary transplant team greater than one year ago.  In review of chart, noted to have aortic aneurysm increase in size to 5.o and patient had been referred to [facility] to see surgeon for further assessment, however, patient did not follow up with call to surgery department.  I spoke with patient today and he reports he was not sure he wanted to pursue this due to comorbidities he currently has. I have given him a contact number to call surgery department if he wishes to investigate this further as he considers potential surgery options.  Instructed patient we will close referral for pulmonary transplant and to call us if his symptoms change or if he wishes to reconsider and transplant.

## 2019-11-13 NOTE — LETTER
November 13, 2019    Michele Herrera  [address]   [place] [address]    Dear Justyna Javier,   You were last seen by Dr. Jorge Alberto Brooke on 7-31-18 in consultation as a potential lung transplant candidate. At the time of this consultation you were deemed to well for pursuing lung transplantation as your pulmonary function tests were stable and you had a limited need for addtional supplemental oxygen. As it has been over one year since you last visit we will close your referral. If your condition changes and you wish to pursue transplant in the future please let us know.   Important things you should know:    If you would like to discuss the decision, or if your medical status changes you may schedule a return visits with your doctor by calling [phone] and asking to speak to your transplant coordinator.    We recommend that you continue to follow up with your primary care doctor in order to manage your health concerns.  Enclosed is a letter from [employer] which describes the services offered to patients by [facility] and the [facility].  Thank you for allowing us to participate in your care.  We wish you well.  Sincerely,    Solid Organ Transplant  [facility]    Enclosures: [employer] Letter  cc: Care Team